# Patient Record
Sex: MALE | Race: WHITE | Employment: FULL TIME | URBAN - METROPOLITAN AREA
[De-identification: names, ages, dates, MRNs, and addresses within clinical notes are randomized per-mention and may not be internally consistent; named-entity substitution may affect disease eponyms.]

---

## 2024-10-18 ENCOUNTER — OFFICE VISIT (OUTPATIENT)
Dept: INTERNAL MEDICINE CLINIC | Facility: CLINIC | Age: 39
End: 2024-10-18
Payer: COMMERCIAL

## 2024-10-18 ENCOUNTER — LAB ENCOUNTER (OUTPATIENT)
Dept: LAB | Age: 39
End: 2024-10-18
Attending: INTERNAL MEDICINE
Payer: COMMERCIAL

## 2024-10-18 VITALS
OXYGEN SATURATION: 98 % | SYSTOLIC BLOOD PRESSURE: 122 MMHG | RESPIRATION RATE: 16 BRPM | HEART RATE: 71 BPM | DIASTOLIC BLOOD PRESSURE: 68 MMHG | HEIGHT: 70.87 IN | TEMPERATURE: 98 F | WEIGHT: 183.81 LBS | BODY MASS INDEX: 25.73 KG/M2

## 2024-10-18 DIAGNOSIS — Z00.00 ENCOUNTER FOR PREVENTATIVE ADULT HEALTH CARE EXAMINATION: Primary | ICD-10-CM

## 2024-10-18 DIAGNOSIS — Z00.00 ENCOUNTER FOR PREVENTATIVE ADULT HEALTH CARE EXAMINATION: ICD-10-CM

## 2024-10-18 DIAGNOSIS — Z23 NEED FOR IMMUNIZATION AGAINST INFLUENZA: ICD-10-CM

## 2024-10-18 DIAGNOSIS — Z30.09 VASECTOMY EVALUATION: ICD-10-CM

## 2024-10-18 DIAGNOSIS — Z23 NEED FOR DIPHTHERIA-TETANUS-PERTUSSIS (TDAP) VACCINE: ICD-10-CM

## 2024-10-18 PROBLEM — M65.4 RADIAL STYLOID TENOSYNOVITIS OF RIGHT HAND: Status: ACTIVE | Noted: 2021-08-12

## 2024-10-18 PROBLEM — M65.4 RADIAL STYLOID TENOSYNOVITIS OF RIGHT HAND: Status: RESOLVED | Noted: 2021-08-12 | Resolved: 2024-10-18

## 2024-10-18 LAB
ALBUMIN SERPL-MCNC: 4.5 G/DL (ref 3.2–4.8)
ALBUMIN/GLOB SERPL: 2 {RATIO} (ref 1–2)
ALP LIVER SERPL-CCNC: 57 U/L
ALT SERPL-CCNC: 36 U/L
ANION GAP SERPL CALC-SCNC: 7 MMOL/L (ref 0–18)
AST SERPL-CCNC: 28 U/L (ref ?–34)
BASOPHILS # BLD AUTO: 0.03 X10(3) UL (ref 0–0.2)
BASOPHILS NFR BLD AUTO: 0.9 %
BILIRUB SERPL-MCNC: 0.7 MG/DL (ref 0.3–1.2)
BUN BLD-MCNC: 17 MG/DL (ref 9–23)
CALCIUM BLD-MCNC: 9.6 MG/DL (ref 8.7–10.4)
CHLORIDE SERPL-SCNC: 108 MMOL/L (ref 98–112)
CHOLEST SERPL-MCNC: 143 MG/DL (ref ?–200)
CO2 SERPL-SCNC: 27 MMOL/L (ref 21–32)
CREAT BLD-MCNC: 0.88 MG/DL
EGFRCR SERPLBLD CKD-EPI 2021: 113 ML/MIN/1.73M2 (ref 60–?)
EOSINOPHIL # BLD AUTO: 0.03 X10(3) UL (ref 0–0.7)
EOSINOPHIL NFR BLD AUTO: 0.9 %
ERYTHROCYTE [DISTWIDTH] IN BLOOD BY AUTOMATED COUNT: 12.4 %
EST. AVERAGE GLUCOSE BLD GHB EST-MCNC: 85 MG/DL (ref 68–126)
FASTING PATIENT LIPID ANSWER: YES
FASTING STATUS PATIENT QL REPORTED: YES
GLOBULIN PLAS-MCNC: 2.3 G/DL (ref 2–3.5)
GLUCOSE BLD-MCNC: 88 MG/DL (ref 70–99)
HBA1C MFR BLD: 4.6 % (ref ?–5.7)
HCT VFR BLD AUTO: 42.1 %
HDLC SERPL-MCNC: 50 MG/DL (ref 40–59)
HGB BLD-MCNC: 14.8 G/DL
IMM GRANULOCYTES # BLD AUTO: 0.01 X10(3) UL (ref 0–1)
IMM GRANULOCYTES NFR BLD: 0.3 %
LDLC SERPL CALC-MCNC: 83 MG/DL (ref ?–100)
LYMPHOCYTES # BLD AUTO: 1.15 X10(3) UL (ref 1–4)
LYMPHOCYTES NFR BLD AUTO: 34.8 %
MCH RBC QN AUTO: 32.4 PG (ref 26–34)
MCHC RBC AUTO-ENTMCNC: 35.2 G/DL (ref 31–37)
MCV RBC AUTO: 92.1 FL
MONOCYTES # BLD AUTO: 0.33 X10(3) UL (ref 0.1–1)
MONOCYTES NFR BLD AUTO: 10 %
NEUTROPHILS # BLD AUTO: 1.75 X10 (3) UL (ref 1.5–7.7)
NEUTROPHILS # BLD AUTO: 1.75 X10(3) UL (ref 1.5–7.7)
NEUTROPHILS NFR BLD AUTO: 53.1 %
NONHDLC SERPL-MCNC: 93 MG/DL (ref ?–130)
OSMOLALITY SERPL CALC.SUM OF ELEC: 295 MOSM/KG (ref 275–295)
PLATELET # BLD AUTO: 176 10(3)UL (ref 150–450)
POTASSIUM SERPL-SCNC: 3.9 MMOL/L (ref 3.5–5.1)
PROT SERPL-MCNC: 6.8 G/DL (ref 5.7–8.2)
RBC # BLD AUTO: 4.57 X10(6)UL
SODIUM SERPL-SCNC: 142 MMOL/L (ref 136–145)
TRIGL SERPL-MCNC: 45 MG/DL (ref 30–149)
VLDLC SERPL CALC-MCNC: 7 MG/DL (ref 0–30)
WBC # BLD AUTO: 3.3 X10(3) UL (ref 4–11)

## 2024-10-18 PROCEDURE — 83036 HEMOGLOBIN GLYCOSYLATED A1C: CPT

## 2024-10-18 PROCEDURE — 36415 COLL VENOUS BLD VENIPUNCTURE: CPT

## 2024-10-18 PROCEDURE — 80061 LIPID PANEL: CPT

## 2024-10-18 PROCEDURE — 80053 COMPREHEN METABOLIC PANEL: CPT

## 2024-10-18 PROCEDURE — 85025 COMPLETE CBC W/AUTO DIFF WBC: CPT

## 2024-10-18 NOTE — PROGRESS NOTES
Thomas Nina is a 38 year old male.   HPI:     Chief Complaint   Patient presents with    Physical     Patient presents for CPX/wellness examination and to establish care.  Diet: Generally healthy   Exercise: Exercises 3-4 times a week  Vision: Wears glasses - last eye exam was about a year ago  Dental: Had a cleaning a month ago.    Acute issues:    Chronic issues:    Surgeries: Hernia surgery  Family hx: Father had brain cancer, mother had multiple sclerosis    Past medical, family, surgical and social history were reviewed and updated in chart.  REVIEW OF SYSTEMS:   GENERAL/ const: no fevers/chills, no unintentional weight loss  SKIN: denies any unusual skin lesions  EYES:no vision problems  HEENT: denies sinus pain or sinus tenderness  LUNGS: denies shortness of breath   CARDIOVASCULAR: denies chest pain  GI: denies nausea/emesis/ abdominal pain diarrhea constipation  : denies dysuria   MUSCULOSKELETAL: no arthralgias  NEURO: denies headaches  PSYCHIATRIC: denies issues  ENDOCRINE: no hot/cold intolerance  ALLERGY: Allergies[1]  PAST HISTORY:   No current outpatient medications on file.  Medical:  has a past medical history of Allergic rhinitis (2000), Displacement of lumbar intervertebral disc without myelopathy (04/23/2013), Migraine (11/21/2013), Mononeuritis (12/08/2013), Open wound of jaw (12/15/2013), Radial styloid tenosynovitis of right hand (08/12/2021), Right lower quadrant pain (12/08/2013), and Syncope and collapse.  Surgical:  has a past surgical history that includes hernia surgery (1985).  Family: family history includes Cancer in his father; Multiple sclerosis (HCC) in his mother.  Social:  reports that he has never smoked. He has never used smokeless tobacco. He reports that he does not currently use alcohol after a past usage of about 1.0 standard drink of alcohol per week. He reports that he does not use drugs.  Wt Readings from Last 6 Encounters:   10/18/24 183 lb 12.8 oz (83.4 kg)      EXAM:   /68 (BP Location: Left arm, Patient Position: Sitting, Cuff Size: adult)   Pulse 71   Temp 98 °F (36.7 °C) (Temporal)   Resp 16   Ht 5' 10.87\" (1.8 m)   Wt 183 lb 12.8 oz (83.4 kg)   SpO2 98%   BMI 25.73 kg/m²   GENERAL: Alert and oriented, well developed, well nourished,in no apparent distress  SKIN: no rashes,no suspicious lesions  HEENT: atraumatic, PERRLA, EOMI, normal lid and conjunctiva, normal external canals and tympanic membranes bilaterally  NECK: supple, no jvd, no thyromegaly, no palpable/tender cervical lymphadenopathy  LUNGS: clear to auscultation bilaterally, no wheezing/rubs  CARDIO: RRR without murmurs.  No clubbing, cyanosis or edema.  GI: soft non tender nondistended no hepatosplenomegaly, bowel sounds throughout  NEURO: CN II-XII intact, 5/5 strength all extremities  MS: Full ROM, no joint pain  PSYCH: pleasant, appropriate mood and affect  ASSESSMENT AND PLAN:   1.  Encounter for preventative adult health examination  2. Need for diphtheria-tetanus-pertussis (Tdap) vaccine  3. Need for immunization against influenza  4. Vasectomy evaluation  Age appropriate health guidance and counseling provided.  Screening labs ordered as below.  Body mass index is 25.73 kg/m².   Declines flu shot.  He will check his records to see if he has received tetanus shot in the past 10 years, if not will schedule nurse visit.  Colonoscopy due at 45.  He desires vasectomy - referred to our Urology clinic.  - CBC With Differential With Platelet; Future  - Comp Metabolic Panel (14); Future  - Lipid Panel; Future  - Hemoglobin A1C; Future  - Urology Referral - In Network    No care team member to display  The patient indicates understanding of these issues and agrees to the plan.  The patient is asked to return to clinic in 12 months for follow up on chronic issues/CPX, or earlier if acute issues arise.    Macy Chavez MD             [1]   Allergies  Allergen Reactions    Ceclor HIVES

## 2024-10-18 NOTE — PATIENT INSTRUCTIONS
- Get lab tests done today  - Check your immunization records to see if you have received a tetanus shot (Td, TDaP, or DTaP) in the past 10 years.  If you have not, you can call our office to schedule a nurse visit this shot.  - Colonoscopy will be due at age 45  - Generally we start check PSA test (prostate cancer screening test) in mid to late 40's as well  - Follow up with our Urologists to discuss vasectomy:  Lonny Licona, and [facility]  [address] [place] [address] [place] [id number]  [phone]    It was a pleasure seeing you in the clinic today.  Thank you for choosing the [facility] [place] office for your healthcare needs. Please call at [phone] with any questions or concerns.    Macy Chavez MD

## 2024-12-18 NOTE — PROGRESS NOTES
Urology Clinic Note - New Patient    Referring Provider:  No referring provider defined for this encounter.     Primary Care Provider:  No primary care provider on file.     Chief Complaint:   Referral for vasectomy    HPI:   Thomas Nina is a 39 year old male with allergies referred for interest in male sterility via vasectomy.     He has 2 current children with his wife. He has thought about vasectomy for a while now and desires no further children.    Had hernia repair as child; does not recall details.   Blood thinners: no  Prior bleeding problems: no  Prior scrotal surgery: no    PSA:  No results found for: \"PSA\", \"PERCENTPSA\", \"PSAS\", \"PSAULTRA\"     History:     Past Medical History:    Allergic rhinitis    mild    Displacement of lumbar intervertebral disc without myelopathy    Recorded Elsewhere: No; Location: [facility]; Source: EHR      Migraine    Recorded Elsewhere: No; Location: [facility]; Source: EHR      Mononeuritis    Recorded Elsewhere: No; Location: [facility]; Source: EHR      Open wound of jaw    Recorded Elsewhere: No; Location: [facility]; Source: EHR      Radial styloid tenosynovitis of right hand    Right lower quadrant pain    Recorded Elsewhere: No; Location: [facility]; Source: EHR      Syncope and collapse       Past Surgical History:   Procedure Laterality Date    Hernia surgery  1985    As an infant       Family History   Problem Relation Age of Onset    Cancer Father         Glioblastoma    Multiple sclerosis (HCC) Mother        Social History     Socioeconomic History    Marital status:    Tobacco Use    Smoking status: Never    Smokeless tobacco: Never   Vaping Use    Vaping status: Never Used   Substance and Sexual Activity    Alcohol use: Not Currently     Alcohol/week: 1.0 standard drink of alcohol     Types: 1 Cans of beer per week     Comment: Not every week    Drug use: Never   Other  Topics Concern    Caffeine Concern No    Exercise Yes    Seat Belt Yes    Special Diet No    Stress Concern No    Weight Concern No       Medications (Active prior to today's visit):  No current outpatient medications on file.       Allergies:  Allergies[1]    Review of Systems:   A comprehensive 10-point review of systems was completed.  Pertinent positives and negatives are noted in the the HPI.    Physical Exam:   CONSTITUTIONAL: Well developed, well nourished, in no acute distress  NEUROLOGIC: Alert and oriented  HEAD: Normocephalic, atraumatic  EYES: Sclera non-icteric  ENT: Hearing intact, moist mucous membranes  NECK: No obvious goiter or masses  RESPIRATORY: Normal respiratory effort  SKIN: No evident rashes  ABDOMEN: Soft, non-tender, non-distended  GENITOURINARY: Normal phallus, orthotopic meatus, normal bilateral testicles, palpable vasa bilaterally    Assessment & Plan:   Thomas Nina is a 39 year old male referred for vasectomy consult.    We discussed that a vasectomy is intended to be a permanent form of contraception and that if he desires fertility after vasectomy, options included vasectomy reversal and sperm retrieval with possible in vitro fertilization. These options are not always successful and may be very expensive.    We also discussed the risks of vasectomy which include symptomatic hematoma and infection (1-2%), chronic scrotal pain (6%; caused by congestive epididymitis, sperm granuloma and/or infective epididymoorchitis), need for repeat vasectomy (<1% of cases), need for additional procedures, vasectomy failure, and unwanted pregnancy (1 in 2000 men). The chronic scrotal discomfort may be no more than a low-grade chronic ache that causes little disability and requires only symptomatic treatment. However, a small percentage of patients will be sufficiently debilitated to seek epididymectomy or even orchiectomy (removal of the epididymis and/or testicle). Furthermore, for a very small  number of patients, even this radical surgery will not provide relief from their scrotal discomfort.     We discussed that he will have two small incisions in his scrotum with dissolvable sutures. He was told that vasectomy does NOT produce immediate sterility and that following vasectomy, another form of contraception is required until vas occlusion is confirmed by post-vasectomy semen analysis. Post-vasectomy semen analysis will be obtained 10-12 weeks after the vasectomy. He was told that he may stop using other methods of contraception when examination of one well-mixed, uncentrifuged, fresh post-vasectomy semen specimen shows azoospermia or only rare non-motile sperm. He was once again told that even after vas occlusion is confirmed, vasectomy is not 100% reliable in preventing pregnancy and that the risk of pregnancy after vasectomy is approximately 1 in 2,000 men who have post-vasectomy azoospermia or semen analysis showing rare non-motile sperm. The reasons are early recanalization of the vas deferens or the presence of an accessory vas unrecognized at the time of surgery. There have also been cases of DNA-confirmed paternity despite documented azoospermia before and after conception. He was told to refrain from ejaculation for approximately one week after vasectomy.    I advised him to hold any blood thinners prior to this procedure, to trim the hair on the scrotum the day before the procedure, and to arrange for someone to drive him to and from the procedure if possible. He understands that for a few days after the procedure he will need to take it easy with minimal activity, ice the area, and keep the compression dressing/jock strap on. He understands and elects to proceed with vasectomy.      - Book for vasectomy under local anesthesia in clinic with me, next available  - Hold NSAIDs or Aspirin for 7 days before procedure  - Valium 10mg PRN before procedure (informed must have ride home if  taking)    Thank you for this consult.    I have personally reviewed all relevant medical records, labs, and imaging.         Yair Mukherjee MD  Staff Urologist  [facility]  Office: [phone]         [1]   Allergies  Allergen Reactions    Ceclor HIVES

## 2024-12-19 ENCOUNTER — OFFICE VISIT (OUTPATIENT)
Dept: SURGERY | Facility: CLINIC | Age: 39
End: 2024-12-19
Payer: COMMERCIAL

## 2024-12-19 DIAGNOSIS — Z30.09 STERILIZATION CONSULT: Primary | ICD-10-CM

## 2024-12-19 PROCEDURE — 99203 OFFICE O/P NEW LOW 30 MIN: CPT | Performed by: UROLOGY

## 2024-12-19 RX ORDER — DIAZEPAM 10 MG/1
10 TABLET ORAL SEE ADMIN INSTRUCTIONS
Qty: 1 TABLET | Refills: 0 | Status: SHIPPED | OUTPATIENT
Start: 2024-12-19

## 2025-02-07 NOTE — PROGRESS NOTES
Clinic Procedure Note    INDICATIONS:   Thomas Nina is a 39 year old male desiring elective sterility via bilateral vasectomy.     PROCEDURE:       1. Bilateral vasectomy    DATE OF PROCEDURE: 2/7/2025     PRE-PROCEDURE DIAGNOSIS: Family Planning/Desires Male Sterility    POST-PROCEDURE DIAGNOSIS: Same     SURGEON: Yair Mukherjee MD    ANESTHESIA: Local (10 mL of lidocaine 1% plainon each side)    SPECIMENS: Segment of RIGHT vas, segment of LEFT vas    FINDINGS:  Normal bilateral vasectomy performed.    DISCUSSION:  We discussed that a vasectomy is intended to be a permanent form of contraception and that if he desires fertility after vasectomy, options included vasectomy reversal and sperm retrieval with possible in vitro fertilization. These options are not always successful and may be very expensive.    We also discussed the risks of vasectomy which include symptomatic hematoma and infection (1-2%), chronic scrotal pain (6%; caused by congestive epididymitis, sperm granuloma and/or infective epididymoorchitis), need for repeat vasectomy (<1% of cases), need for additional procedures, vasectomy failure, and unwanted pregnancy (1 in 2000 men). The chronic scrotal discomfort may be no more than a low-grade chronic ache that causes little disability and requires only symptomatic treatment. However, a small percentage of patients will be sufficiently debilitated to seek epididymectomy or even orchiectomy (removal of the epididymis and/or testicle). Furthermore, for a very small number of patients, even this radical surgery will not provide relief from their scrotal discomfort.     We discussed that he will have two small incisions in his scrotum with dissolvable sutures. He was told that vasectomy does NOT produce immediate sterility and that following vasectomy, another form of contraception is required until vas occlusion is confirmed by post-vasectomy semen analysis. Post-vasectomy semen analysis will be obtained  10-15 weeks after the vasectomy. He was told that he may stop using other methods of contraception when examination of one well-mixed, uncentrifuged, fresh post-vasectomy semen specimen shows azoospermia or only rare non-motile sperm. He was once again told that even after vas occlusion is confirmed, vasectomy is not 100% reliable in preventing pregnancy and that the risk of pregnancy after vasectomy is approximately 1 in 2,000 men who have post-vasectomy azoospermia or semen analysis showing rare non-motile sperm. The reasons are early recanalization of the vas deferens or the presence of an accessory vas unrecognized at the time of surgery. There have also been cases of DNA-confirmed paternity despite documented azoospermia before and after conception. He was told to refrain from ejaculation for approximately one week after vasectomy.    After discussion of all risks and benefits the patient elected to proceed and informed consent form was signed.    PROCEDURE:     After the appropriate time out checklist was performed, confirming the correct patient and procedure, the scrotum was prepped and draped in standard fashion.     The right vas was grasped and brought up underneath the skin surface. The skin, Dartos, and vasal sheath were injected with lidocaine. Once the skin was numb, a #15 scalpel was used to make a small incision overlying the vas in the right hemiscrotum. A small snap was used to spread the tissue overlying the vas to make room for the vas ring forceps. The ring forceps was used to grasp the vas deferens to hold it in place. I used the 15 blade to cut away the vasal sheath and used additional vas clamps to re-grasp the isolated vas.  I then pushed down any remaining attached vasal sheath and secured each end of the vas with a small mosquito clamp. A small segment of the vas was excised and passed off the field for specimen. The needle tip Bovie was used to cauterize the lumen of both the abdominal  and testicular ends of the transected vas. Any present bleeding vessels were treated with cautery. There was no bleeding seen from the vasal stumps, and they were dropped back into the scrotum. The dartos muscle was cauterized.   This area was then kept open to assess for interval bleeding.     The left vas was grasped and brought up underneath the skin surface. The skin, Dartos, and vasal sheath were injected with lidocaine. Once the skin was numb, a #15 scalpel was used to make a small incision overlying the vas in the left hemiscrotum. A small snap was used to spread the tissue overlying the vas to make room for the vas ring forceps. The ring forceps was used to grasp the vas deferens to hold it in place. I used the 15 blade to cut away the vasal sheath and used additional vas clamps to re-grasp the isolated vas.  I then pushed down any remaining attached vasal sheath and secured each end of the vas with a small mosquito clamp.   A small segment of the vas was excised and passed off the field for specimen. The needle tip Bovie was used to cauterize the lumen of both the abdominal and testicular ends of the transected vas. Any present bleeding vessels were treated with cautery. There was no bleeding seen from the vasal stumps, and they were dropped back into the scrotum. The dartos muscle was cauterized.   This area was then kept open to assess for interval bleeding.     I then inspected the right side incision; there was no bleeding and this incision was closed with running 3-0 chromic.   I then inspected the left side incision, there was no bleeding and this incision was closed with running 3-0 chromic suture.     I then used skin glue on each incision. Once dry, fluff gauze and scrotal support were placed.     DISPOSITION: Home    FOLLOW-UP: Post-procedure care instructions were given to the patient. Post-vasectomy semen analysis will be performed 10-15 weeks after vasectomy. Patient knows to use another form  of contraception until vasal occlusion is confirmed by post-vasectomy semen analysis.      Yair Mukherjee MD  Staff Urologist  [facility]  Office: [phone]

## 2025-02-13 ENCOUNTER — PROCEDURE (OUTPATIENT)
Dept: SURGERY | Facility: CLINIC | Age: 40
End: 2025-02-13
Payer: COMMERCIAL

## 2025-02-13 VITALS — SYSTOLIC BLOOD PRESSURE: 145 MMHG | DIASTOLIC BLOOD PRESSURE: 68 MMHG

## 2025-02-13 DIAGNOSIS — Z30.09 STERILIZATION CONSULT: Primary | ICD-10-CM

## 2025-02-13 PROCEDURE — 55250 REMOVAL OF SPERM DUCT(S): CPT | Performed by: UROLOGY

## 2025-02-13 PROCEDURE — 3077F SYST BP >= 140 MM HG: CPT | Performed by: UROLOGY

## 2025-02-13 PROCEDURE — 3078F DIAST BP <80 MM HG: CPT | Performed by: UROLOGY

## 2025-05-22 ENCOUNTER — LAB ENCOUNTER (OUTPATIENT)
Dept: LAB | Facility: HOSPITAL | Age: 40
End: 2025-05-22
Attending: INTERNAL MEDICINE
Payer: COMMERCIAL

## 2025-05-22 DIAGNOSIS — Z30.09 STERILIZATION CONSULT: ICD-10-CM

## 2025-05-22 PROCEDURE — 89310 SEMEN ANALYSIS W/COUNT: CPT
